# Patient Record
Sex: FEMALE | Race: WHITE | NOT HISPANIC OR LATINO | Employment: OTHER | URBAN - METROPOLITAN AREA
[De-identification: names, ages, dates, MRNs, and addresses within clinical notes are randomized per-mention and may not be internally consistent; named-entity substitution may affect disease eponyms.]

---

## 2017-01-16 NOTE — PATIENT DISCUSSION
Continue: Refresh Classic (PF) (polyvinyl alcohol-povidon(pf)): dropperette: 1.4-0.6% 1 drop twice a day as needed into both eyes

## 2018-10-23 NOTE — PATIENT DISCUSSION
"""Reassured atient that intraocular pressures (IOPs) are at target levels and other ocular findings ""

## 2019-01-17 NOTE — PATIENT DISCUSSION
Allergic Conjunctivitis Counseling: The clinical exam and history are most compatible with an allergic conjunctivitis or allergy symptoms. I have explained the etiology of the allergies. Drops were prescribed to use when symptoms are present and patient was encouraged to avoid rubbing their eyes. The patient understands that continued monitoring is important to make sure the symptoms resolve with current therapy and to check for changes that may suggest an alternative diagnosis. Return for follow-up as scheduled or sooner if symptoms worsen. Yes

## 2020-01-30 NOTE — PATIENT DISCUSSION
01/30/2020\[facility] [place] [facility]\""[phone]&nbsp;SN &nbsp; &nbsp; lcs

## 2020-11-13 ENCOUNTER — NEW PATIENT EMERGENCY (OUTPATIENT)
Dept: URBAN - METROPOLITAN AREA CLINIC 47 | Facility: CLINIC | Age: 60
End: 2020-11-13

## 2020-11-13 DIAGNOSIS — H00.15: ICD-10-CM

## 2020-11-13 PROCEDURE — 92002 INTRM OPH EXAM NEW PATIENT: CPT

## 2020-11-13 ASSESSMENT — VISUAL ACUITY
OS_SC: 20/20-1
OD_SC: 20/30+1

## 2020-11-23 ENCOUNTER — ESTABLISHED PATIENT (OUTPATIENT)
Dept: URBAN - METROPOLITAN AREA CLINIC 39 | Facility: CLINIC | Age: 60
End: 2020-11-23

## 2020-11-23 DIAGNOSIS — H00.12: ICD-10-CM

## 2020-11-23 PROCEDURE — 92285 EXTERNAL OCULAR PHOTOGRAPHY: CPT

## 2020-11-23 PROCEDURE — 99212 OFFICE O/P EST SF 10 MIN: CPT

## 2020-11-23 RX ORDER — TOBRAMYCIN AND DEXAMETHASONE 1; 3 MG/ML; MG/ML
1 SUSPENSION/ DROPS OPHTHALMIC
Start: 2020-11-23 | End: 2020-12-08

## 2020-11-23 ASSESSMENT — VISUAL ACUITY
OD_SC: 20/20-1
OS_SC: 20/20

## 2023-02-02 NOTE — PATIENT DISCUSSION
MILD DRY EYES: CONTINUE ARTIFICIAL TEARS BID - QID, OU RETURN FOR FOLLOW-UP AS SCHEDULED OR SOONER IF SYMPTOMS WORSEN. Unna Boot Text: An Unna boot was placed to help immobilize the limb and facilitate more rapid healing.

## 2023-04-27 ENCOUNTER — ESTABLISHED PATIENT (OUTPATIENT)
Dept: URBAN - METROPOLITAN AREA CLINIC 39 | Facility: CLINIC | Age: 63
End: 2023-04-27

## 2023-04-27 DIAGNOSIS — D49.2: ICD-10-CM

## 2023-04-27 DIAGNOSIS — H00.12: ICD-10-CM

## 2023-04-27 PROCEDURE — 99212 OFFICE O/P EST SF 10 MIN: CPT

## 2023-04-27 PROCEDURE — 92285 EXTERNAL OCULAR PHOTOGRAPHY: CPT

## 2023-04-27 ASSESSMENT — VISUAL ACUITY
OS_SC: 20/20
OD_SC: 20/20-1

## 2023-05-16 ENCOUNTER — FOLLOW UP (OUTPATIENT)
Dept: URBAN - METROPOLITAN AREA CLINIC 44 | Facility: CLINIC | Age: 63
End: 2023-05-16

## 2023-05-16 DIAGNOSIS — D49.2: ICD-10-CM

## 2023-05-16 PROCEDURE — 67810 INCAL BX EYELID SKN LID MRGN: CPT

## 2023-05-16 PROCEDURE — 92285 EXTERNAL OCULAR PHOTOGRAPHY: CPT

## 2023-05-16 ASSESSMENT — VISUAL ACUITY
OD_SC: 20/20-1
OS_SC: 20/20